# Patient Record
Sex: MALE | Race: WHITE | NOT HISPANIC OR LATINO | URBAN - METROPOLITAN AREA
[De-identification: names, ages, dates, MRNs, and addresses within clinical notes are randomized per-mention and may not be internally consistent; named-entity substitution may affect disease eponyms.]

---

## 2017-04-14 ENCOUNTER — APPOINTMENT (RX ONLY)
Dept: URBAN - METROPOLITAN AREA OTHER 10 | Facility: OTHER | Age: 27
Setting detail: DERMATOLOGY
End: 2017-04-14

## 2017-04-14 DIAGNOSIS — L67.1 VARIATIONS IN HAIR COLOR: ICD-10-CM

## 2017-04-14 PROCEDURE — ? ORDER TESTS

## 2017-04-14 PROCEDURE — ? COUNSELING

## 2017-04-14 PROCEDURE — ? PRESCRIPTION

## 2017-04-14 PROCEDURE — 99213 OFFICE O/P EST LOW 20 MIN: CPT

## 2017-04-14 RX ORDER — CLOBETASOL PROPIONATE 0.46 MG/ML
1 SOLUTION TOPICAL BID
Qty: 50 | Refills: 1 | Status: ERX | COMMUNITY
Start: 2017-04-14

## 2017-04-14 RX ADMIN — CLOBETASOL PROPIONATE 1: 0.46 SOLUTION TOPICAL at 00:00

## 2017-04-14 ASSESSMENT — LOCATION DETAILED DESCRIPTION DERM
LOCATION DETAILED: LEFT SUPERIOR MEDIAL FOREHEAD
LOCATION DETAILED: MID-FRONTAL SCALP
LOCATION DETAILED: RIGHT CENTRAL FRONTAL SCALP
LOCATION DETAILED: LEFT CENTRAL FRONTAL SCALP

## 2017-04-14 ASSESSMENT — LOCATION SIMPLE DESCRIPTION DERM
LOCATION SIMPLE: ANTERIOR SCALP
LOCATION SIMPLE: RIGHT SCALP
LOCATION SIMPLE: LEFT SCALP
LOCATION SIMPLE: LEFT FOREHEAD

## 2017-04-14 ASSESSMENT — LOCATION ZONE DERM
LOCATION ZONE: FACE
LOCATION ZONE: SCALP

## 2017-04-18 ENCOUNTER — RX ONLY (OUTPATIENT)
Age: 27
Setting detail: RX ONLY
End: 2017-04-18

## 2017-04-18 RX ORDER — PREDNISONE 10 MG/1
TABLET ORAL
Qty: 52 | Refills: 0 | Status: ERX | COMMUNITY
Start: 2017-04-18

## 2019-06-13 ENCOUNTER — APPOINTMENT (RX ONLY)
Dept: URBAN - METROPOLITAN AREA OTHER 10 | Facility: OTHER | Age: 29
Setting detail: DERMATOLOGY
End: 2019-06-13

## 2019-06-13 DIAGNOSIS — L81.4 OTHER MELANIN HYPERPIGMENTATION: ICD-10-CM

## 2019-06-13 DIAGNOSIS — L67.1 VARIATIONS IN HAIR COLOR: ICD-10-CM

## 2019-06-13 DIAGNOSIS — B36.0 PITYRIASIS VERSICOLOR: ICD-10-CM

## 2019-06-13 DIAGNOSIS — D22 MELANOCYTIC NEVI: ICD-10-CM

## 2019-06-13 PROBLEM — D22.5 MELANOCYTIC NEVI OF TRUNK: Status: ACTIVE | Noted: 2019-06-13

## 2019-06-13 PROCEDURE — ? PRESCRIPTION

## 2019-06-13 PROCEDURE — ? COUNSELING

## 2019-06-13 PROCEDURE — 99213 OFFICE O/P EST LOW 20 MIN: CPT

## 2019-06-13 PROCEDURE — ? TREATMENT REGIMEN

## 2019-06-13 RX ORDER — FLUCONAZOLE 150 MG/1
2 TABLET ORAL WEEKLY
Qty: 4 | Refills: 1 | Status: ERX | COMMUNITY
Start: 2019-06-13

## 2019-06-13 RX ADMIN — FLUCONAZOLE 2: 150 TABLET ORAL at 14:39

## 2019-06-13 ASSESSMENT — LOCATION SIMPLE DESCRIPTION DERM
LOCATION SIMPLE: ANTERIOR SCALP
LOCATION SIMPLE: RIGHT LOWER BACK
LOCATION SIMPLE: LEFT LOWER BACK
LOCATION SIMPLE: RIGHT UPPER BACK

## 2019-06-13 ASSESSMENT — LOCATION ZONE DERM
LOCATION ZONE: TRUNK
LOCATION ZONE: SCALP

## 2019-06-13 ASSESSMENT — LOCATION DETAILED DESCRIPTION DERM
LOCATION DETAILED: RIGHT SUPERIOR LATERAL MIDBACK
LOCATION DETAILED: MID-FRONTAL SCALP
LOCATION DETAILED: LEFT SUPERIOR MEDIAL MIDBACK
LOCATION DETAILED: RIGHT SUPERIOR UPPER BACK

## 2019-06-13 NOTE — HPI: SKIN LESIONS
How Severe Is Your Skin Lesion?: mild
Have Your Skin Lesions Been Treated?: not been treated
Is This A New Presentation, Or A Follow-Up?: Growths
Which Family Member (Optional)?: Father

## 2019-06-13 NOTE — PROCEDURE: TREATMENT REGIMEN
Otc Regimen: Selsun blue shampoo
Detail Level: Zone
Initiate Treatment: Fluconazole 150mg 2 tablets weekly x 2 weeks during flares